# Patient Record
(demographics unavailable — no encounter records)

---

## 2025-01-02 NOTE — HISTORY OF PRESENT ILLNESS
[FreeTextEntry1] : 27-year-old female presents for blood pressure and OCP check.  Menarche was at the age of 11.  She was taking Junel Fe 1/20 for heavy menses.   Prior to OCP's, menses were every 28 days, lasting 13 days.  She denies cramping.  She has no other significant GYN history.  She has been sexually active in the past but is not currently sexually active.  She was concerned she was not getting her menses on Junel FE 1/20.  She was interested in trying another pill and was changed to Junel FE 1.5/30.  The patient has been taking this medication for the past 3 months.  She states she is getting her menstrual cycle but feels as if her anxiety and OCD has been worse since starting this OCP.  She states her therapist is also noticed that her anxiety and OCD appears to be worse.  She is interested in trying another combined OCP.  She is not interested in other options at this time.  Past medical history is significant for OCD and anxiety.  Past surgical history includes a breast reduction and removal of fibroadenoma from the left breast.  Family history is significant for paternal aunt with breast cancer at the age of 60.  Her maternal grandmother had breast cancer at the age of 80.  She socially drinks alcohol.  She denies tobacco or illicit drugs.  GYN history as above.  She is nulligravida.  She is allergic to Bactrim.  She currently takes lamotrigine and fluxamine.

## 2025-01-02 NOTE — PLAN
[FreeTextEntry1] : 27-year-old female with history of heavy menses.  She was taking Junel FE 1/20 which was controlling her symptoms but she was concerned that she was not getting her menses.  She was started on Junel FE 1.5/30 and has been taking this medication for 3 months.  The patient feels as if this medication made her anxiety and OCD worse however, she is now getting her menstrual cycle.  She is interested in trying another combined OCP.  Rx was given for Ocella x 3 months.  All risk, benefits and potential complications of combined OCPs were reviewed with the patient.  Discussed with the patient that if her symptoms are not alleviated with this medication we can go back to Junel FE 1/20 or try progesterone only option.  The patient was given the opportunity to ask questions and all were answered to her satisfaction.